# Patient Record
Sex: FEMALE | Race: WHITE | Employment: FULL TIME | URBAN - METROPOLITAN AREA
[De-identification: names, ages, dates, MRNs, and addresses within clinical notes are randomized per-mention and may not be internally consistent; named-entity substitution may affect disease eponyms.]

---

## 2021-04-07 LAB — SARS-COV-2: DETECTED

## 2021-04-11 PROCEDURE — 96365 THER/PROPH/DIAG IV INF INIT: CPT

## 2021-04-11 PROCEDURE — 99284 EMERGENCY DEPT VISIT MOD MDM: CPT

## 2021-04-12 ENCOUNTER — HOSPITAL ENCOUNTER (INPATIENT)
Age: 21
LOS: 3 days | Discharge: HOME | DRG: 566 | End: 2021-04-15
Attending: EMERGENCY MEDICINE | Admitting: OBSTETRICS & GYNECOLOGY
Payer: MEDICARE

## 2021-04-12 ENCOUNTER — APPOINTMENT (OUTPATIENT)
Dept: GENERAL RADIOLOGY | Age: 21
DRG: 566 | End: 2021-04-12
Payer: MEDICARE

## 2021-04-12 DIAGNOSIS — U07.1 COVID-19 VIRUS INFECTION: Primary | ICD-10-CM

## 2021-04-12 DIAGNOSIS — Z3A.32 32 WEEKS GESTATION OF PREGNANCY: ICD-10-CM

## 2021-04-12 PROBLEM — Z3A.30 30 WEEKS GESTATION OF PREGNANCY: Status: ACTIVE | Noted: 2021-04-12

## 2021-04-12 PROBLEM — O99.891 BACTERIURIA IN PREGNANCY: Status: ACTIVE | Noted: 2021-04-12

## 2021-04-12 PROBLEM — R82.71 BACTERIURIA IN PREGNANCY: Status: ACTIVE | Noted: 2021-04-12

## 2021-04-12 PROBLEM — J06.9 2019-NCOV ACUTE RESPIRATORY DISEASE: Status: ACTIVE | Noted: 2021-04-12

## 2021-04-12 LAB
A/G RATIO: 0.7 (ref 1.1–2.2)
A/G RATIO: 0.9 (ref 1.1–2.2)
ALBUMIN SERPL-MCNC: 2.6 G/DL (ref 3.4–5)
ALBUMIN SERPL-MCNC: 3 G/DL (ref 3.4–5)
ALP BLD-CCNC: 139 U/L (ref 40–129)
ALP BLD-CCNC: 148 U/L (ref 40–129)
ALT SERPL-CCNC: 22 U/L (ref 10–40)
ALT SERPL-CCNC: 25 U/L (ref 10–40)
ANION GAP SERPL CALCULATED.3IONS-SCNC: 13 MMOL/L (ref 3–16)
ANION GAP SERPL CALCULATED.3IONS-SCNC: 15 MMOL/L (ref 3–16)
APTT: 29.3 SEC (ref 24.2–36.2)
AST SERPL-CCNC: 22 U/L (ref 15–37)
AST SERPL-CCNC: 22 U/L (ref 15–37)
BACTERIA: ABNORMAL /HPF
BASOPHILS ABSOLUTE: 0 K/UL (ref 0–0.2)
BASOPHILS ABSOLUTE: 0.1 K/UL (ref 0–0.2)
BASOPHILS RELATIVE PERCENT: 0.1 %
BASOPHILS RELATIVE PERCENT: 0.6 %
BILIRUB SERPL-MCNC: 0.7 MG/DL (ref 0–1)
BILIRUB SERPL-MCNC: 0.8 MG/DL (ref 0–1)
BILIRUBIN URINE: ABNORMAL
BLOOD, URINE: NEGATIVE
BUN BLDV-MCNC: 5 MG/DL (ref 7–20)
BUN BLDV-MCNC: 7 MG/DL (ref 7–20)
CALCIUM SERPL-MCNC: 7.9 MG/DL (ref 8.3–10.6)
CALCIUM SERPL-MCNC: 8.1 MG/DL (ref 8.3–10.6)
CHLORIDE BLD-SCNC: 107 MMOL/L (ref 99–110)
CHLORIDE BLD-SCNC: 110 MMOL/L (ref 99–110)
CLARITY: CLEAR
CO2: 12 MMOL/L (ref 21–32)
CO2: 15 MMOL/L (ref 21–32)
COLOR: ABNORMAL
CREAT SERPL-MCNC: <0.5 MG/DL (ref 0.6–1.1)
CREAT SERPL-MCNC: <0.5 MG/DL (ref 0.6–1.1)
D DIMER: 445 NG/ML DDU (ref 0–229)
EKG ATRIAL RATE: 112 BPM
EKG DIAGNOSIS: NORMAL
EKG P AXIS: 92 DEGREES
EKG P-R INTERVAL: 82 MS
EKG Q-T INTERVAL: 354 MS
EKG QRS DURATION: 88 MS
EKG QTC CALCULATION (BAZETT): 483 MS
EKG R AXIS: 84 DEGREES
EKG T AXIS: 49 DEGREES
EKG VENTRICULAR RATE: 112 BPM
EOSINOPHILS ABSOLUTE: 0 K/UL (ref 0–0.6)
EOSINOPHILS ABSOLUTE: 0 K/UL (ref 0–0.6)
EOSINOPHILS RELATIVE PERCENT: 0 %
EOSINOPHILS RELATIVE PERCENT: 0.1 %
EPITHELIAL CELLS, UA: 5 /HPF (ref 0–5)
GFR AFRICAN AMERICAN: >60
GFR AFRICAN AMERICAN: >60
GFR NON-AFRICAN AMERICAN: >60
GFR NON-AFRICAN AMERICAN: >60
GLOBULIN: 3.2 G/DL
GLOBULIN: 3.5 G/DL
GLUCOSE BLD-MCNC: 87 MG/DL (ref 70–99)
GLUCOSE BLD-MCNC: 95 MG/DL (ref 70–99)
GLUCOSE URINE: NEGATIVE MG/DL
HCT VFR BLD CALC: 30.4 % (ref 36–48)
HCT VFR BLD CALC: 32.3 % (ref 36–48)
HEMOGLOBIN: 10.1 G/DL (ref 12–16)
HEMOGLOBIN: 10.3 G/DL (ref 12–16)
HYALINE CASTS: 4 /LPF (ref 0–8)
INR BLD: 0.95 (ref 0.86–1.14)
KETONES, URINE: >=80 MG/DL
LACTIC ACID, SEPSIS: 0.8 MMOL/L (ref 0.4–1.9)
LACTIC ACID, SEPSIS: 0.8 MMOL/L (ref 0.4–1.9)
LEUKOCYTE ESTERASE, URINE: ABNORMAL
LYMPHOCYTES ABSOLUTE: 0.8 K/UL (ref 1–5.1)
LYMPHOCYTES ABSOLUTE: 1.1 K/UL (ref 1–5.1)
LYMPHOCYTES RELATIVE PERCENT: 11 %
LYMPHOCYTES RELATIVE PERCENT: 6.8 %
MCH RBC QN AUTO: 27.2 PG (ref 26–34)
MCH RBC QN AUTO: 27.4 PG (ref 26–34)
MCHC RBC AUTO-ENTMCNC: 31.9 G/DL (ref 31–36)
MCHC RBC AUTO-ENTMCNC: 33.3 G/DL (ref 31–36)
MCV RBC AUTO: 81.8 FL (ref 80–100)
MCV RBC AUTO: 85.8 FL (ref 80–100)
MICROSCOPIC EXAMINATION: YES
MONOCYTES ABSOLUTE: 0.5 K/UL (ref 0–1.3)
MONOCYTES ABSOLUTE: 0.8 K/UL (ref 0–1.3)
MONOCYTES RELATIVE PERCENT: 4.5 %
MONOCYTES RELATIVE PERCENT: 6.7 %
NEUTROPHILS ABSOLUTE: 10 K/UL (ref 1.7–7.7)
NEUTROPHILS ABSOLUTE: 8.4 K/UL (ref 1.7–7.7)
NEUTROPHILS RELATIVE PERCENT: 83.9 %
NEUTROPHILS RELATIVE PERCENT: 86.3 %
NITRITE, URINE: NEGATIVE
PDW BLD-RTO: 14.6 % (ref 12.4–15.4)
PDW BLD-RTO: 15 % (ref 12.4–15.4)
PH UA: 6.5 (ref 5–8)
PLATELET # BLD: 172 K/UL (ref 135–450)
PLATELET # BLD: 235 K/UL (ref 135–450)
PMV BLD AUTO: 7.9 FL (ref 5–10.5)
PMV BLD AUTO: 8.6 FL (ref 5–10.5)
POTASSIUM REFLEX MAGNESIUM: 3.7 MMOL/L (ref 3.5–5.1)
POTASSIUM REFLEX MAGNESIUM: 3.9 MMOL/L (ref 3.5–5.1)
PROTEIN UA: ABNORMAL MG/DL
PROTHROMBIN TIME: 11 SEC (ref 10–13.2)
RBC # BLD: 3.72 M/UL (ref 4–5.2)
RBC # BLD: 3.76 M/UL (ref 4–5.2)
RBC UA: 2 /HPF (ref 0–4)
SODIUM BLD-SCNC: 135 MMOL/L (ref 136–145)
SODIUM BLD-SCNC: 137 MMOL/L (ref 136–145)
SPECIFIC GRAVITY UA: 1.03 (ref 1–1.03)
TOTAL PROTEIN: 6.1 G/DL (ref 6.4–8.2)
TOTAL PROTEIN: 6.2 G/DL (ref 6.4–8.2)
TROPONIN: <0.01 NG/ML
URINE TYPE: ABNORMAL
UROBILINOGEN, URINE: >=8 E.U./DL
WBC # BLD: 10 K/UL (ref 4–11)
WBC # BLD: 11.5 K/UL (ref 4–11)
WBC UA: 4 /HPF (ref 0–5)

## 2021-04-12 PROCEDURE — 85379 FIBRIN DEGRADATION QUANT: CPT

## 2021-04-12 PROCEDURE — 81001 URINALYSIS AUTO W/SCOPE: CPT

## 2021-04-12 PROCEDURE — 6360000002 HC RX W HCPCS: Performed by: INTERNAL MEDICINE

## 2021-04-12 PROCEDURE — 83605 ASSAY OF LACTIC ACID: CPT

## 2021-04-12 PROCEDURE — 94760 N-INVAS EAR/PLS OXIMETRY 1: CPT

## 2021-04-12 PROCEDURE — 84484 ASSAY OF TROPONIN QUANT: CPT

## 2021-04-12 PROCEDURE — 85610 PROTHROMBIN TIME: CPT

## 2021-04-12 PROCEDURE — 2580000003 HC RX 258: Performed by: STUDENT IN AN ORGANIZED HEALTH CARE EDUCATION/TRAINING PROGRAM

## 2021-04-12 PROCEDURE — 6370000000 HC RX 637 (ALT 250 FOR IP): Performed by: EMERGENCY MEDICINE

## 2021-04-12 PROCEDURE — 2580000003 HC RX 258: Performed by: EMERGENCY MEDICINE

## 2021-04-12 PROCEDURE — 6370000000 HC RX 637 (ALT 250 FOR IP): Performed by: INTERNAL MEDICINE

## 2021-04-12 PROCEDURE — 6360000002 HC RX W HCPCS: Performed by: EMERGENCY MEDICINE

## 2021-04-12 PROCEDURE — 85730 THROMBOPLASTIN TIME PARTIAL: CPT

## 2021-04-12 PROCEDURE — 93010 ELECTROCARDIOGRAM REPORT: CPT | Performed by: INTERNAL MEDICINE

## 2021-04-12 PROCEDURE — 59025 FETAL NON-STRESS TEST: CPT

## 2021-04-12 PROCEDURE — 85025 COMPLETE CBC W/AUTO DIFF WBC: CPT

## 2021-04-12 PROCEDURE — 6370000000 HC RX 637 (ALT 250 FOR IP): Performed by: OBSTETRICS & GYNECOLOGY

## 2021-04-12 PROCEDURE — 2060000000 HC ICU INTERMEDIATE R&B

## 2021-04-12 PROCEDURE — 93005 ELECTROCARDIOGRAM TRACING: CPT | Performed by: EMERGENCY MEDICINE

## 2021-04-12 PROCEDURE — 80053 COMPREHEN METABOLIC PANEL: CPT

## 2021-04-12 PROCEDURE — 36415 COLL VENOUS BLD VENIPUNCTURE: CPT

## 2021-04-12 PROCEDURE — 71045 X-RAY EXAM CHEST 1 VIEW: CPT

## 2021-04-12 RX ORDER — LABETALOL 100 MG/1
100 TABLET, FILM COATED ORAL 2 TIMES DAILY
COMMUNITY
Start: 2021-03-24 | End: 2022-03-24

## 2021-04-12 RX ORDER — PROMETHAZINE HYDROCHLORIDE 25 MG/1
25 TABLET ORAL EVERY 6 HOURS PRN
COMMUNITY
Start: 2021-04-07

## 2021-04-12 RX ORDER — DEXAMETHASONE SODIUM PHOSPHATE 10 MG/ML
8 INJECTION, SOLUTION INTRAMUSCULAR; INTRAVENOUS EVERY 24 HOURS
Status: DISCONTINUED | OUTPATIENT
Start: 2021-04-12 | End: 2021-04-15 | Stop reason: HOSPADM

## 2021-04-12 RX ORDER — 0.9 % SODIUM CHLORIDE 0.9 %
1000 INTRAVENOUS SOLUTION INTRAVENOUS ONCE
Status: COMPLETED | OUTPATIENT
Start: 2021-04-12 | End: 2021-04-12

## 2021-04-12 RX ORDER — GUAIFENESIN/DEXTROMETHORPHAN 100-10MG/5
5 SYRUP ORAL EVERY 4 HOURS PRN
Status: DISCONTINUED | OUTPATIENT
Start: 2021-04-12 | End: 2021-04-15 | Stop reason: HOSPADM

## 2021-04-12 RX ORDER — ACETAMINOPHEN 500 MG
500 TABLET ORAL EVERY 6 HOURS PRN
COMMUNITY
Start: 2021-04-07

## 2021-04-12 RX ORDER — ACETAMINOPHEN 325 MG/1
650 TABLET ORAL EVERY 6 HOURS PRN
Status: DISCONTINUED | OUTPATIENT
Start: 2021-04-12 | End: 2021-04-15 | Stop reason: HOSPADM

## 2021-04-12 RX ORDER — ONDANSETRON 2 MG/ML
4 INJECTION INTRAMUSCULAR; INTRAVENOUS EVERY 6 HOURS PRN
Status: DISCONTINUED | OUTPATIENT
Start: 2021-04-12 | End: 2021-04-15 | Stop reason: HOSPADM

## 2021-04-12 RX ORDER — ACETAMINOPHEN 325 MG/1
650 TABLET ORAL ONCE
Status: COMPLETED | OUTPATIENT
Start: 2021-04-12 | End: 2021-04-12

## 2021-04-12 RX ORDER — SODIUM CHLORIDE 0.9 % (FLUSH) 0.9 %
5-40 SYRINGE (ML) INJECTION EVERY 12 HOURS SCHEDULED
Status: DISCONTINUED | OUTPATIENT
Start: 2021-04-12 | End: 2021-04-15 | Stop reason: HOSPADM

## 2021-04-12 RX ORDER — ACETAMINOPHEN 650 MG/1
650 SUPPOSITORY RECTAL EVERY 6 HOURS PRN
Status: DISCONTINUED | OUTPATIENT
Start: 2021-04-12 | End: 2021-04-15 | Stop reason: HOSPADM

## 2021-04-12 RX ORDER — SODIUM CHLORIDE 0.9 % (FLUSH) 0.9 %
5-40 SYRINGE (ML) INJECTION PRN
Status: DISCONTINUED | OUTPATIENT
Start: 2021-04-12 | End: 2021-04-15 | Stop reason: HOSPADM

## 2021-04-12 RX ORDER — SODIUM CHLORIDE 9 MG/ML
25 INJECTION, SOLUTION INTRAVENOUS PRN
Status: DISCONTINUED | OUTPATIENT
Start: 2021-04-12 | End: 2021-04-15 | Stop reason: HOSPADM

## 2021-04-12 RX ORDER — PRENATAL VIT/IRON FUM/FOLIC AC 27MG-0.8MG
1 TABLET ORAL DAILY
Status: DISCONTINUED | OUTPATIENT
Start: 2021-04-12 | End: 2021-04-15 | Stop reason: HOSPADM

## 2021-04-12 RX ADMIN — SODIUM CHLORIDE, PRESERVATIVE FREE 10 ML: 5 INJECTION INTRAVENOUS at 20:57

## 2021-04-12 RX ADMIN — SODIUM CHLORIDE 1000 ML: 9 INJECTION, SOLUTION INTRAVENOUS at 01:22

## 2021-04-12 RX ADMIN — DEXAMETHASONE SODIUM PHOSPHATE 8 MG: 10 INJECTION, SOLUTION INTRAMUSCULAR; INTRAVENOUS at 16:31

## 2021-04-12 RX ADMIN — CEFTRIAXONE 1000 MG: 1 INJECTION, POWDER, FOR SOLUTION INTRAMUSCULAR; INTRAVENOUS at 05:00

## 2021-04-12 RX ADMIN — PRENATAL VIT W/ FE FUMARATE-FA TAB 27-0.8 MG 1 TABLET: 27-0.8 TAB at 12:16

## 2021-04-12 RX ADMIN — GUAIFENESIN SYRUP AND DEXTROMETHORPHAN 5 ML: 100; 10 SYRUP ORAL at 20:56

## 2021-04-12 RX ADMIN — SODIUM CHLORIDE 1000 ML: 9 INJECTION, SOLUTION INTRAVENOUS at 05:50

## 2021-04-12 RX ADMIN — ACETAMINOPHEN 650 MG: 325 TABLET ORAL at 01:22

## 2021-04-12 ASSESSMENT — PAIN SCALES - GENERAL
PAINLEVEL_OUTOF10: 0
PAINLEVEL_OUTOF10: 5

## 2021-04-12 ASSESSMENT — PAIN DESCRIPTION - ORIENTATION
ORIENTATION: MID
ORIENTATION: MID

## 2021-04-12 ASSESSMENT — PAIN DESCRIPTION - FREQUENCY: FREQUENCY: CONTINUOUS

## 2021-04-12 ASSESSMENT — PAIN DESCRIPTION - DESCRIPTORS: DESCRIPTORS: ACHING

## 2021-04-12 ASSESSMENT — PAIN - FUNCTIONAL ASSESSMENT
PAIN_FUNCTIONAL_ASSESSMENT: ACTIVITIES ARE NOT PREVENTED
PAIN_FUNCTIONAL_ASSESSMENT: ACTIVITIES ARE NOT PREVENTED

## 2021-04-12 ASSESSMENT — PAIN DESCRIPTION - ONSET: ONSET: ON-GOING

## 2021-04-12 ASSESSMENT — PAIN DESCRIPTION - PROGRESSION: CLINICAL_PROGRESSION: NOT CHANGED

## 2021-04-12 NOTE — CONSULTS
BMI 32.50 kg/m²      General appearance: No apparent distress, appears stated age and cooperative. HEENT: Normal cephalic, atraumatic without obvious deformity. Pupils equal, round, and reactive to light. Extra ocular muscles intact. Conjunctivae/corneas clear. Neck: Supple, with full range of motion. No jugular venous distention. Trachea midline. Respiratory:  Normal respiratory effort. Clear to auscultation, bilaterally without Rales/Wheezes/Rhonchi. Cardiovascular: Regular rate and rhythm with normal S1/S2 without murmurs, rubs or gallops. Abdomen: Soft, non-tender, distended with normal bowel sounds. Musculoskeletal: No clubbing, cyanosis or edema bilaterally. Skin: Skin color, texture, turgor normal.  No rashes or lesions. Neurologic:  Neurovascularly intact without any focal sensory/motor deficits.  Cranial nerves: II-XII intact, grossly non-focal.  Psychiatric: Alert and oriented, thought content appropriate, normal insight  Capillary Refill: Brisk,< 3 seconds   Peripheral Pulses: +2 palpable, equal bilaterally     Labs:     Recent Labs     04/12/21 0115 04/12/21  0658   WBC 11.5* 10.0   HGB 10.1* 10.3*   HCT 30.4* 32.3*    172     Recent Labs     04/12/21 0115 04/12/21  0658   * 137   K 3.7 3.9    110   CO2 15* 12*   BUN 7 5*   CREATININE <0.5* <0.5*   CALCIUM 8.1* 7.9*     Recent Labs     04/12/21 0115 04/12/21  0658   AST 22 22   ALT 25 22   BILITOT 0.8 0.7   ALKPHOS 139* 148*     Recent Labs     04/12/21 0115   INR 0.95     Recent Labs     04/12/21 0115   TROPONINI <0.01       Urinalysis:    Lab Results   Component Value Date    NITRU Negative 04/12/2021    WBCUA 4 04/12/2021    BACTERIA RARE 04/12/2021    RBCUA 2 04/12/2021    BLOODU Negative 04/12/2021    SPECGRAV 1.027 04/12/2021    GLUCOSEU Negative 04/12/2021       Radiology: I have reviewed the radiology reports with the following interpretation:     XR CHEST PORTABLE   Final Result   Multifocal airspace opacities worrisome for atypical/viral pneumonia               ASSESSMENT:    Active Hospital Problems    Diagnosis Date Noted    COVID-19 [U07.1] 04/12/2021    30 weeks gestation of pregnancy [Z3A.30] 04/12/2021    2019-nCoV acute respiratory disease [U07.1, J06.9] 04/12/2021    Bacteriuria in pregnancy [O99.891, R82.71] 04/12/2021       PLAN:    COVID 19 PNA - diagnosed outside on Wednesday, will start IV decadron given hypoxia. Monitor.  Add robitussin for cough prn, CXR reviewed - started on rocephin    32 week pregnancy - ob/gyn consulted    Acute hypoxic resp failure - placed on 2 L O2      DVT Prophylaxis: SCD  Diet: DIET GENERAL;  Dietary Nutrition Supplements: Standard High Calorie Oral Supplement  Code Status: Full Code    PT/OT Eval Status: Active and ongoing    Dispo - cont care    Thank you for the consultation, will follow up as needed    Electronically signed by Ly Swartz MD on 4/12/21 at 6:58 PM EDT

## 2021-04-12 NOTE — PROGRESS NOTES
Pt to Rm#[id number] via ED stretcher with all personal belongings. Oriented to room and role as RN. Pt transfered to bed, alert and oriented x4. Assessment of pt in progress. POC and education initiated per protocol. Call light within reach. Bed in lowest position and wheels locked. Room is free of clutter. Personal belongings within reach. Will continue to monitor. Pt notably labored breathing/tachypneic with RR of 26 and with frequent dry/non-productive cough that is causing 5/10 mid chest pain that is sore/aching in nature. Pt updated on POC.

## 2021-04-12 NOTE — ED PROVIDER NOTES
[facility]      Pt Name: Yenni Rolon  MRN: [id number]  [place] 2000  Date of evaluation: 4/11/2021  Provider: Maggie Vanegas MD    CHIEF COMPLAINT       Chief Complaint   Patient presents with    Positive For Covid-19     + for COVID pneumonia on Wednesday, c/o worsening shortness of breath and coughing, also states urine is brown; did not get any antibx, 32 weeks pregnant         HISTORY OF PRESENT ILLNESS   (Location/Symptom, Timing/Onset,Context/Setting, Quality, Duration, Modifying Factors, Severity)  Note limiting factors. Yenni Rolon is a 23 y.o. female who presents to the emergency department for evaluation of worsening COVID-19 symptoms. Patient reports that she is currently approximately 32 weeks pregnant, G1, P0. She began feeling ill last Sunday and was diagnosed with Covid on Wednesday. She states she has been feeling worse every day since Wednesday. She does report shortness of breath and midsternal chest pain which she describes as sharp, worse with movement, coughing, and deep inspiration. She denies any unilateral lower extremity swelling. She states that she has been trying to drink, but eating less than normal and she noticed that her urine is a brownish color. She does report cough which is been nonproductive as well as shortness of breath. She follows with Dr. Bard Romberg for OB/GYN, and has had normal prenatal care. Patient does report that she has had some intermittent suprapubic discomfort as well, but is still feeling baby move normally. She states that the suprapubic discomfort is worsened by coughing as well. No vaginal discharge or bleeding. NursingNotes were reviewed. REVIEW OF SYSTEMS    (2-9 systems for level 4, 10 or more for level 5)       Constitutional: No fever. Chills, fatigue. Eye: No visual disturbances. No eye pain. Ear/Nose/Mouth/Throat: No nasal congestion.  No sore throat. Respiratory: Nonproductive cough, shortness of breath, No sputum production. Cardiovascular: Midsternal pleuritic chest pain. No palpitations. Gastrointestinal: No abdominal pain. No nausea or vomiting  Genitourinary: No dysuria. No hematuria. Hematology/Lymphatics: No bleeding or bruising tendency. Immunologic: Generalized malaise. No swollen glands. Musculoskeletal: No back pain. Diffuse body aches. Integumentary: No rash. No abrasions. Neurologic: No headache. No focal numbness or weakness. PAST MEDICAL HISTORY   History reviewed. No pertinent past medical history. SURGICALHISTORY     History reviewed. No pertinent surgical history. CURRENT MEDICATIONS       Previous Medications    No medications on file       ALLERGIES     Patient has no known allergies. FAMILY HISTORY     History reviewed. No pertinent family history.        SOCIAL HISTORY       Social History     Socioeconomic History    Marital status: Single     Spouse name: None    Number of children: None    Years of education: None    Highest education level: None   Occupational History    None   Social Needs    Financial resource strain: None    Food insecurity     Worry: None     Inability: None    Transportation needs     Medical: None     Non-medical: None   Tobacco Use    Smoking status: Never Smoker    Smokeless tobacco: Never Used   Substance and Sexual Activity    Alcohol use: None    Drug use: None    Sexual activity: None   Lifestyle    Physical activity     Days per week: None     Minutes per session: None    Stress: None   Relationships    Social connections     Talks on phone: None     Gets together: None     Attends [employer] service: None     Active member of club or organization: None     Attends meetings of clubs or organizations: None     Relationship status: None    Intimate partner violence     Fear of current or ex partner: None     Emotionally abused: None     Physically abused: None Forced sexual activity: None   Other Topics Concern    None   Social History Narrative    None       SCREENINGS             PHYSICAL EXAM    (up to 7 for level 4, 8 or more for level 5)     ED Triage Vitals [04/11/21 2347]   BP Temp Temp Source Pulse Resp SpO2 Height Weight   116/73 98.9 °F (37.2 °C) Oral 119 24 94 % 5' 5\" (1.651 m) 195 lb 5.2 oz (88.6 kg)       General: Alert and oriented, No acute distress. Eye: Normal conjunctiva. Pupils equal and reactive. HENT: Oral mucosa is moist.  Respiratory: Lungs are clear to auscultation, Respirations are non-labored. Cardiovascular: Normal rate, Regular rhythm. Gastrointestinal: Soft, Non-tender, Non-distended. Gravid abdomen. Musculoskeletal: No swelling. Integumentary: Warm, Dry. Neurologic: Alert, Oriented, No focal defects. Psychiatric: Cooperative.     DIAGNOSTIC RESULTS     EKG: All EKG's are interpreted by the Emergency Department Physician who either signs or Co-signsthis chart in the absence of a cardiologist.    Per my interpretation:    Electrocardiogram (ECG) 4/12/2021 103  RATE: 112 bpm  RHYTHM: normal sinus  AXIS: normal  INTERVALS: normal  ST-T WAVE CHANGES: No evidence of ST segment elevation or T-wave inversion, nonspecific ST abnormality  Prior for comparison - none    RADIOLOGY:   Non-plain filmimages such as CT, Ultrasound and MRI are read by the radiologist. Plain radiographic images are visualized and preliminarily interpreted by the emergency physician with the below findings:      Interpretation per the Radiologist below, if available at the time ofthis note:    XR CHEST PORTABLE   Final Result   Multifocal airspace opacities worrisome for atypical/viral pneumonia               ED BEDSIDE ULTRASOUND:   Performed by ED Physician - none    LABS:  Labs Reviewed   CBC WITH AUTO DIFFERENTIAL - Abnormal; Notable for the following components:       Result Value    WBC 11.5 (*)     RBC 3.72 (*)     Hemoglobin 10.1 (*)     Hematocrit 30.4 (*)     Neutrophils Absolute 10.0 (*)     Lymphocytes Absolute 0.8 (*)     All other components within normal limits    Narrative:     Performed at:  [address] [facility] [employer]   Phone [phone]   COMPREHENSIVE METABOLIC PANEL W/ REFLEX TO MG FOR LOW K - Abnormal; Notable for the following components:    Sodium 135 (*)     CO2 15 (*)     CREATININE <0.5 (*)     Calcium 8.1 (*)     Total Protein 6.2 (*)     Albumin 3.0 (*)     Albumin/Globulin Ratio 0.9 (*)     Alkaline Phosphatase 139 (*)     All other components within normal limits    Narrative:     Performed at:  [address] [facility] [address]   Phone [phone]   D-DIMER, QUANTITATIVE - Abnormal; Notable for the following components:    D-Dimer, Quant 445 (*)     All other components within normal limits    Narrative:     Performed at:  [address] [facility] [address]   Phone [phone]   URINALYSIS - Abnormal; Notable for the following components:    Bilirubin Urine MODERATE (*)     Ketones, Urine >=80 (*)     Protein, UA TRACE (*)     Urobilinogen, Urine >=8.0 (*)     Leukocyte Esterase, Urine TRACE (*)     All other components within normal limits    Narrative:     Performed at:  [address] [facility]   Phone [phone]   MICROSCOPIC URINALYSIS - Abnormal; Notable for the following components:    Bacteria, UA RARE (*)     All other components within normal limits    Narrative:     Performed at:  [address] [facility]   Phone [phone]   TROPONIN    Narrative:     Performed at:  [facility]  [address] [place] [address] [facility]   Phone [phone]   LACTATE, SEPSIS    Narrative:     Performed at:  [facility]  [address] [place] [facility] [facility] [address]   Phone [phone]   PROTIME-INR    Narrative:     Performed at:  [facility]  [address] [place] [facility] [address]   Phone [phone]   APTT    Narrative:     Performed at:  [facility]  [address]  [place] [facility] [address]   Phone [phone]   LACTATE, SEPSIS       All other labs were within normal range or not returned as of this dictation. EMERGENCY DEPARTMENT COURSE and DIFFERENTIAL DIAGNOSIS/MDM:   Vitals:    Vitals:    04/11/21 2347 04/12/21 0215   BP: 116/73    Pulse: 119 107   Resp: 24 27   Temp: 98.9 °F (37.2 °C)    TempSrc: Oral    SpO2: 94% 94%   Weight: 195 lb 5.2 oz (88.6 kg)    Height: 5' 5\" (1.651 m)          Medical decision making: This is a 59-year-old female G1, P0 at 28 weeks gestation who presents for evaluation of worsening shortness of breath in the setting of COVID-19 infection, diagnosed outpatient on Wednesday. On exam, she is afebrile, tachycardic initially to the 110s, saturation of mid 90s on room air. Ambulation, the patient does not desaturate, but became tachypneic to the 40s, and could not even complete a lap around 1 part of the emergency department. Differential diagnosis includes worsening Covid infection, secondary bacterial infection, pulmonary edema, pulmonary embolus. EKG was obtained shows no evidence of acute ischemia, troponin is negative. CBC with mild episodes of 11.5. CMP is reassuring. Lactate is normal.  Chest x-ray does show multifocal airspace opacities consistent with viral pneumonia. Given Covid and pregnancy, as well as tachycardia and pleuritic chest pain, did consider pulmonary embolus.   D-dimer was obtained and was 445, as this is less than 500 ng/mL, according to the [place] ACS clinical management tool for PE evaluation of pregnancy, PE risk is less than 1% and risk of further work-up likely outweigh benefit. Urinalysis shows trace leukocytes and rare bacteria however given pregnancy we will treat for asymptomatic bacteriuria with ceftriaxone. Urine culture was sent. Urinalysis did note significant ketones, and the second liter of IV fluids as ordered. Given increased work of breathing and tachypnea, did recommend admission for observation and IV hydration and patient is agreeable. I spoke with Dr. Doron Pittman of OB/GYN as well as Dr. Eliz Schmitt of the hospitalist service, and it was agreed that the patient will be admitted under OB/GYN, but hospitalist will manage COVID-19 treatment orders. Patient is agreeable with the plan of care, and stable at the time of admission. CRITICAL CARE TIME   Total Critical Care time was 0 minutes, excluding separately reportable procedures. There was a high probability of clinically significant/life threatening deterioration in the patient's condition which required my urgent intervention. CONSULTS:  IP CONSULT TO OB GYN    PROCEDURES:  Unless otherwise noted below, none         FINAL IMPRESSION      1. COVID-19 virus infection    2. 32 weeks gestation of pregnancy          DISPOSITION/PLAN   DISPOSITION Decision To Admit 04/12/2021 04:21:51 AM      PATIENT REFERRED TO:  No follow-up provider specified.     DISCHARGE MEDICATIONS:  New Prescriptions    No medications on file          (Please note that portions of this note were completed with a voice recognition program.Efforts were made to edit the dictations but occasionally words are mis-transcribed.)    Mat Tran MD (electronically signed)  Attending Emergency Physician            Mat Tran MD  04/12/21 5133

## 2021-04-12 NOTE — LETTER
[facility]  [address] [address]  Phone: [phone]    No name on file. April 15, 2021     Patient: Mateus Jane   YOB: 2000   Date of Visit: 4/11/2021       To Whom It May Concern:    Mateus Jane was admitted to the hospital from 04/12/21 to 04/15/21. It is my medical opinion that Mateus Jane may return to work on 04/18/21 with no restrictions. If you have any questions or concerns, please don't hesitate to call.     Sincerely,        Jesu Wills RN

## 2021-04-12 NOTE — CARE COORDINATION
INITIAL CASE MANAGEMENT ASSESSMENT    Reviewed chart, met with patient to assess possible discharge needs. Explained Case Management role/services. Living Situation: Confirmed address, lives w/ boyfriend, 1 level house, level entry    ADLs: Independent, boyfriend assists with homemaking     DME: None    PT/OT Recs: Not ordered     Active Services: None     Transportation: Active  - boyfriend to transport home     Medications: [address]    PCP: None - has just been seeing OB/GYN (Dr Patti Devine) at [facility] - PCP list given for follow up      HD/PD: n/a    PLAN/COMMENTS: Patient plans on returning home w/ boyfriend at discharge. Patient 32 weeks pregnant, on 2L O2 -- monitor for needs. Patient tested positive for covid on 4/7 at [facility] provided contact information for patient or family to call with any questions. CM will follow and assist as needed.     Electronically signed by Sheng Stark RN Case Management [phone] on 4/12/2021 at 11:06 AM

## 2021-04-12 NOTE — H&P
Department of Obstetrics and Gynecology   Obstetrics History and Physical        CHIEF COMPLAINT:   Patient came for shortness of breath with positive COVID test    HISTORY OF PRESENT ILLNESS:    Charlotte Layton  is a 23 y.o.  female at 28 weeks presents with a chief complaint as above and is being admitted for  Respiratory distress. Patient first noted symptoms about one week ago. She initially complained of fever, fatigue, body aches which has progressed to shortness of breath and chest pain. She feels her heart beating  rapidly and she is having trouble breathing with activity. She denies OB complaints. ..she denies contractions, bleeding, loss of fluid or decreased fetal movements         PRENATAL CARE: Complicated by: gestational hypertension    PAST OB HISTORY:  OB History        1    Para        Term                AB        Living           SAB        TAB        Ectopic        Molar        Multiple        Live Births                  Past Medical History:    No medical problems outside of pregnancy    Past Surgical History:    Patient denies previous surgery    Allergies:  NKDA    Social History:  Denies alcohol, tobacco, or street drug use    Family History:   History reviewed. No pertinent family history. Medications Prior to Admission:  Labetalol  Phenergan    REVIEW OF SYSTEMS:   fevers, fatigue, malaise, body aches,  Dyspnea, palpitations     PHYSICAL EXAM:    Vitals:    21 2347 21 0215 21 0521 21 0522   BP: 116/73  113/66    Pulse: 119 107 87 86   Resp: 24 27 (!) 32 27   Temp: 98.9 °F (37.2 °C)      TempSrc: Oral      SpO2: 94% 94% 97% 97%   Weight: 195 lb 5.2 oz (88.6 kg)      Height: 5' 5\" (1.651 m)        General appearance:  awake, alert, cooperative, no apparent distress, and appears stated age  Neurologic:  Awake, alert, oriented to name, place and time. Lungs: rapid breathing, decreased breath sounds  Cardiac:  Tachycardia.  No murmurs  Abdomen: Soft, non tender, gravid, fundal height consistent with the gestational age, EFW by Leopold's maneuvers is  , 4 lbs. ,  8 oz., vertex  Pelvis:  Adequate pelvis  Cervix:  Not checked as not in labor  Contraction frequency: every 0 minutes  Membranes:  Intact  Labs:   CBC with Differential:    Lab Results   Component Value Date    WBC 11.5 04/12/2021    RBC 3.72 04/12/2021    HGB 10.1 04/12/2021    HCT 30.4 04/12/2021     04/12/2021    MCV 81.8 04/12/2021    MCH 27.2 04/12/2021    MCHC 33.3 04/12/2021    RDW 14.6 04/12/2021    LYMPHOPCT 6.8 04/12/2021    MONOPCT 6.7 04/12/2021    BASOPCT 0.1 04/12/2021    MONOSABS 0.8 04/12/2021    LYMPHSABS 0.8 04/12/2021    EOSABS 0.0 04/12/2021    BASOSABS 0.0 04/12/2021     CMP:    Lab Results   Component Value Date     04/12/2021    K 3.7 04/12/2021     04/12/2021    CO2 15 04/12/2021    BUN 7 04/12/2021    CREATININE <0.5 04/12/2021    GFRAA >60 04/12/2021    AGRATIO 0.9 04/12/2021    LABGLOM >60 04/12/2021    GLUCOSE 95 04/12/2021    PROT 6.2 04/12/2021    LABALBU 3.0 04/12/2021    CALCIUM 8.1 04/12/2021    BILITOT 0.8 04/12/2021    ALKPHOS 139 04/12/2021    AST 22 04/12/2021    ALT 25 04/12/2021     PT/INR:    Lab Results   Component Value Date    PROTIME 11.0 04/12/2021    INR 0.95 04/12/2021     PTT:    Lab Results   Component Value Date    APTT 29.3 04/12/2021   [APTT}  U/A:    Lab Results   Component Value Date    COLORU Dark Yellow 04/12/2021    PROTEINU TRACE 04/12/2021    PHUR 6.5 04/12/2021    WBCUA 4 04/12/2021    RBCUA 2 04/12/2021    BACTERIA RARE 04/12/2021    CLARITYU Clear 04/12/2021    SPECGRAV 1.027 04/12/2021    LEUKOCYTESUR TRACE 04/12/2021    UROBILINOGEN >=8.0 04/12/2021    BILIRUBINUR MODERATE 04/12/2021    BLOODU Negative 04/12/2021    GLUCOSEU Negative 04/12/2021       ASSESSMENT: 32 weeks, (private OB at [facility]), admitted for acute respiratory distress in pregnancy     PLAN:  Admit to PCU, managed by hospitalist.

## 2021-04-12 NOTE — PLAN OF CARE
Problem: Pain:  Goal: Pain level will decrease  Description: Pain level will decrease  Outcome: Ongoing  Pain/discomfort being managed with PRN analgesics per MD orders. Pt able to express presence and absence of pain and rate pain appropriately using numerical scale.

## 2021-04-12 NOTE — ED NOTES
Pt ambulated with pulse oximetry. Pt had oxygen saturation in normal range but became tachypneic with ambulation. Pt states that she \"can't catch her breath\" and ambulation stopped short. Milan Winter MD made aware.      Bertha Pedersen RN  04/12/21 0658

## 2021-04-13 LAB
A/G RATIO: 0.9 (ref 1.1–2.2)
ALBUMIN SERPL-MCNC: 2.9 G/DL (ref 3.4–5)
ALP BLD-CCNC: 134 U/L (ref 40–129)
ALT SERPL-CCNC: 18 U/L (ref 10–40)
ANION GAP SERPL CALCULATED.3IONS-SCNC: 11 MMOL/L (ref 3–16)
AST SERPL-CCNC: 17 U/L (ref 15–37)
BASOPHILS ABSOLUTE: 0 K/UL (ref 0–0.2)
BASOPHILS RELATIVE PERCENT: 0.4 %
BILIRUB SERPL-MCNC: 0.7 MG/DL (ref 0–1)
BUN BLDV-MCNC: 4 MG/DL (ref 7–20)
CALCIUM SERPL-MCNC: 8.1 MG/DL (ref 8.3–10.6)
CHLORIDE BLD-SCNC: 110 MMOL/L (ref 99–110)
CO2: 18 MMOL/L (ref 21–32)
CREAT SERPL-MCNC: <0.5 MG/DL (ref 0.6–1.1)
EOSINOPHILS ABSOLUTE: 0 K/UL (ref 0–0.6)
EOSINOPHILS RELATIVE PERCENT: 0 %
GFR AFRICAN AMERICAN: >60
GFR NON-AFRICAN AMERICAN: >60
GLOBULIN: 3.1 G/DL
GLUCOSE BLD-MCNC: 95 MG/DL (ref 70–99)
HCT VFR BLD CALC: 29.6 % (ref 36–48)
HEMOGLOBIN: 10 G/DL (ref 12–16)
LYMPHOCYTES ABSOLUTE: 0.9 K/UL (ref 1–5.1)
LYMPHOCYTES RELATIVE PERCENT: 7.8 %
MCH RBC QN AUTO: 27.6 PG (ref 26–34)
MCHC RBC AUTO-ENTMCNC: 33.8 G/DL (ref 31–36)
MCV RBC AUTO: 81.7 FL (ref 80–100)
MONOCYTES ABSOLUTE: 0.5 K/UL (ref 0–1.3)
MONOCYTES RELATIVE PERCENT: 5 %
NEUTROPHILS ABSOLUTE: 9.4 K/UL (ref 1.7–7.7)
NEUTROPHILS RELATIVE PERCENT: 86.8 %
PDW BLD-RTO: 14.7 % (ref 12.4–15.4)
PLATELET # BLD: 229 K/UL (ref 135–450)
PMV BLD AUTO: 7.9 FL (ref 5–10.5)
POTASSIUM REFLEX MAGNESIUM: 3.8 MMOL/L (ref 3.5–5.1)
PROCALCITONIN: 0.18 NG/ML (ref 0–0.15)
RBC # BLD: 3.62 M/UL (ref 4–5.2)
SODIUM BLD-SCNC: 139 MMOL/L (ref 136–145)
TOTAL PROTEIN: 6 G/DL (ref 6.4–8.2)
WBC # BLD: 10.9 K/UL (ref 4–11)

## 2021-04-13 PROCEDURE — 6360000002 HC RX W HCPCS: Performed by: INTERNAL MEDICINE

## 2021-04-13 PROCEDURE — 6360000002 HC RX W HCPCS: Performed by: STUDENT IN AN ORGANIZED HEALTH CARE EDUCATION/TRAINING PROGRAM

## 2021-04-13 PROCEDURE — 59025 FETAL NON-STRESS TEST: CPT

## 2021-04-13 PROCEDURE — 36415 COLL VENOUS BLD VENIPUNCTURE: CPT

## 2021-04-13 PROCEDURE — 2700000000 HC OXYGEN THERAPY PER DAY

## 2021-04-13 PROCEDURE — 84145 PROCALCITONIN (PCT): CPT

## 2021-04-13 PROCEDURE — 6370000000 HC RX 637 (ALT 250 FOR IP): Performed by: INTERNAL MEDICINE

## 2021-04-13 PROCEDURE — 94761 N-INVAS EAR/PLS OXIMETRY MLT: CPT

## 2021-04-13 PROCEDURE — 2060000000 HC ICU INTERMEDIATE R&B

## 2021-04-13 PROCEDURE — 85025 COMPLETE CBC W/AUTO DIFF WBC: CPT

## 2021-04-13 PROCEDURE — 2580000003 HC RX 258: Performed by: STUDENT IN AN ORGANIZED HEALTH CARE EDUCATION/TRAINING PROGRAM

## 2021-04-13 PROCEDURE — 6370000000 HC RX 637 (ALT 250 FOR IP): Performed by: OBSTETRICS & GYNECOLOGY

## 2021-04-13 PROCEDURE — 80053 COMPREHEN METABOLIC PANEL: CPT

## 2021-04-13 RX ADMIN — SODIUM CHLORIDE, PRESERVATIVE FREE 10 ML: 5 INJECTION INTRAVENOUS at 19:54

## 2021-04-13 RX ADMIN — PRENATAL VIT W/ FE FUMARATE-FA TAB 27-0.8 MG 1 TABLET: 27-0.8 TAB at 07:55

## 2021-04-13 RX ADMIN — DEXAMETHASONE SODIUM PHOSPHATE 8 MG: 10 INJECTION, SOLUTION INTRAMUSCULAR; INTRAVENOUS at 15:35

## 2021-04-13 RX ADMIN — SODIUM CHLORIDE, PRESERVATIVE FREE 10 ML: 5 INJECTION INTRAVENOUS at 07:55

## 2021-04-13 RX ADMIN — CEFTRIAXONE 1000 MG: 1 INJECTION, POWDER, FOR SOLUTION INTRAMUSCULAR; INTRAVENOUS at 04:49

## 2021-04-13 RX ADMIN — GUAIFENESIN SYRUP AND DEXTROMETHORPHAN 5 ML: 100; 10 SYRUP ORAL at 04:49

## 2021-04-13 RX ADMIN — GUAIFENESIN SYRUP AND DEXTROMETHORPHAN 5 ML: 100; 10 SYRUP ORAL at 15:40

## 2021-04-13 ASSESSMENT — PAIN SCALES - GENERAL
PAINLEVEL_OUTOF10: 0
PAINLEVEL_OUTOF10: 0

## 2021-04-13 NOTE — PLAN OF CARE
Problem: Pain:  Goal: Pain level will decrease  Description: Pain level will decrease  4/13/2021 1420 by Aurora Sánchez RN  Outcome: Ongoing   Pain/discomfort being managed with PRN analgesics per MD orders. Pt able to express presence and absence of pain and rate pain appropriately using numerical scale. Problem: Gas Exchange - Impaired  Goal: Absence of hypoxia  4/13/2021 1420 by Aurora Sánchez RN  Outcome: Ongoing     Problem: Airway Clearance - Ineffective  Goal: Achieve or maintain patent airway  4/13/2021 1420 by Aurora Sánchez RN  Outcome: Ongoing     Problem: Nutrition Deficits  Goal: Optimize nutritional status  4/13/2021 1420 by Aurora Sánchez RN  Outcome: Ongoing   Nutrition status assessed and documented. Patient encouraged to take time while eating. Patient educated on the importance of sodium and fluid intake in meals. Will continue to monitor.

## 2021-04-13 NOTE — PROGRESS NOTES
Hospitalist Progress Note      PCP: No primary care provider on file. Date of Admission: 4/12/2021    Chief Complaint: Shortness of breath    Hospital Course: 79-year-old female who is 32 weeks pregnant admitted to the hospital COVID-19 pneumonia    Subjective: Patient feels slightly more short of breath this morning. Denies any fever or chills. She has been coughing and stated that her chest hurts with deep breathing      Medications:  Reviewed    Infusion Medications    sodium chloride       Scheduled Medications    sodium chloride flush  5-40 mL Intravenous 2 times per day    cefTRIAXone (ROCEPHIN) IV  1,000 mg Intravenous Q24H    prenatal vitamin  1 tablet Oral Daily    dexamethasone  8 mg Intravenous Q24H     PRN Meds: sodium chloride flush, sodium chloride, acetaminophen **OR** acetaminophen, ondansetron, guaiFENesin-dextromethorphan      Intake/Output Summary (Last 24 hours) at 4/13/2021 1800  Last data filed at 4/13/2021 1757  Gross per 24 hour   Intake 960 ml   Output 700 ml   Net 260 ml       Physical Exam Performed:    /72   Pulse 101   Temp 97.6 °F (36.4 °C) (Oral)   Resp 24   Ht 5' 5\" (1.651 m)   Wt 195 lb 12.3 oz (88.8 kg)   SpO2 98%   BMI 32.58 kg/m²     General appearance: No apparent distress, appears stated age and cooperative. HEENT: Pupils equal, round, and reactive to light. Conjunctivae/corneas clear. Neck: Supple, with full range of motion. No jugular venous distention. Trachea midline. Respiratory:  Normal respiratory effort. Clear to auscultation, bilaterally without Rales/Wheezes/Rhonchi. Cardiovascular: Regular rate and rhythm with normal S1/S2 without murmurs, rubs or gallops. Abdomen: Soft, non-tender, non-distended with normal bowel sounds. Musculoskeletal: No clubbing, cyanosis or edema bilaterally. Full range of motion without deformity. Skin: Skin color, texture, turgor normal.  No rashes or lesions.   Neurologic:  Neurovascularly intact without any focal sensory/motor deficits.  Cranial nerves: II-XII intact, grossly non-focal.  Psychiatric: Alert and oriented, thought content appropriate, normal insight  Capillary Refill: Brisk,< 3 seconds   Peripheral Pulses: +2 palpable, equal bilaterally       Labs:   Recent Labs     21  0115 21  0658 21  0606   WBC 11.5* 10.0 10.9   HGB 10.1* 10.3* 10.0*   HCT 30.4* 32.3* 29.6*    172 229     Recent Labs     21  0115 21  0658 21  0606   * 137 139   K 3.7 3.9 3.8    110 110   CO2 15* 12* 18*   BUN 7 5* 4*   CREATININE <0.5* <0.5* <0.5*   CALCIUM 8.1* 7.9* 8.1*     Recent Labs     21  0115 21  0658 21  0606   AST 22 22 17   ALT 25 22 18   BILITOT 0.8 0.7 0.7   ALKPHOS 139* 148* 134*     Recent Labs     21  0115   INR 0.95     Recent Labs     21  011   TROPONINI <0.01       Urinalysis:      Lab Results   Component Value Date    NITRU Negative 2021    WBCUA 4 2021    BACTERIA RARE 2021    RBCUA 2 2021    BLOODU Negative 2021    SPECGRAV 1.027 2021    GLUCOSEU Negative 2021       Radiology:  XR CHEST PORTABLE   Final Result   Multifocal airspace opacities worrisome for atypical/viral pneumonia                 Assessment/Plan:    Active Hospital Problems    Diagnosis    COVID-19 [U07.1]    30 weeks gestation of pregnancy [Z3A.30]    2019-nCoV acute respiratory disease [U07.1, J06.9]    Bacteriuria in pregnancy [O99.891, R82.71]       Acute hypoxic respiratory failure due to COVID-19 pneumonia  -Continue Decadron  -Continue oxygen, wean as tolerated  -Check procalcitonin, if negative then will stop Rocephin    Pregnancy -  32 weeks  -OB/GYN following    Diet: DIET GENERAL;  Dietary Nutrition Supplements: Standard High Calorie Oral Supplement  Code Status: Full Code    Dispo -continue monitor oxygen requirements    Rupinder Gloria MD

## 2021-04-13 NOTE — PROGRESS NOTES
Pt's SpO2 down to 82-83%. This RN went to the bedside to find pt resting quietly in bed. Pt denies any recent activity such as getting up out of bed. O2 increased from 2 to 3lpm via nasal cannula.

## 2021-04-13 NOTE — FLOWSHEET NOTE
RN to room for daily NST. Patient 32.5 weeks- baby girl \"June\". EFM applied with consent to central monitor bank with alarms on. Uterus soft and non-tender. + fetal movement per pt. Denies contractions, leaking fluid, and vaginal bleeding/pain. Audible movement on US.

## 2021-04-13 NOTE — PROGRESS NOTES
Patient is 28 5/7 w pregnant. Covid pos, atypical pneumonia, managed by hospitalist. On Decadron, Rocephin and supportive trx. Has no OB related complaints as contractions, decreased fetal movement, LOF, VB. Fetus is active. Fetal nonstress test was reactive today. Will continue daily testing and remote rounding. Awa Peraza do not hesitate to call with any questions or concerns: [phone]. I called pt's Ob/Gyn office (Dr Jere Abdi, [phone]) and informed them of patient's admission.

## 2021-04-13 NOTE — PLAN OF CARE
Problem: Pain:  Goal: Pain level will decrease  Description: Pain level will decrease  4/13/2021 0030 by Fan Pascal RN  Outcome: Ongoing     Problem: Pain:  Goal: Control of acute pain  Description: Control of acute pain  4/13/2021 0030 by Fan Pascal RN  Outcome: Ongoing     Problem: Pain:  Goal: Control of chronic pain  Description: Control of chronic pain  4/13/2021 0030 by Fan Pascal RN  Outcome: Ongoing     Problem: Airway Clearance - Ineffective  Goal: Achieve or maintain patent airway  Outcome: Ongoing     Problem: Gas Exchange - Impaired  Goal: Absence of hypoxia  Outcome: Ongoing     Problem: Gas Exchange - Impaired  Goal: Promote optimal lung function  Outcome: Ongoing     Problem: Breathing Pattern - Ineffective  Goal: Ability to achieve and maintain a regular respiratory rate  Outcome: Ongoing     Problem: Body Temperature -  Risk of, Imbalanced  Goal: Ability to maintain a body temperature within defined limits  Outcome: Ongoing     Problem: Body Temperature -  Risk of, Imbalanced  Goal: Will regain or maintain usual level of consciousness  Outcome: Ongoing     Problem:  Body Temperature -  Risk of, Imbalanced  Goal: Complications related to the disease process, condition or treatment will be avoided or minimized  Outcome: Ongoing     Problem: Isolation Precautions - Risk of Spread of Infection  Goal: Prevent transmission of infection  Outcome: Ongoing     Problem: Nutrition Deficits  Goal: Optimize nutritional status  Outcome: Ongoing     Problem: Risk for Fluid Volume Deficit  Goal: Maintain normal heart rhythm  Outcome: Ongoing     Problem: Risk for Fluid Volume Deficit  Goal: Maintain absence of muscle cramping  Outcome: Ongoing     Problem: Risk for Fluid Volume Deficit  Goal: Maintain normal serum potassium, sodium, calcium, phosphorus, and pH  Outcome: Ongoing     Problem: Loneliness or Risk for Loneliness  Goal: Demonstrate positive use of time alone when socialization is not

## 2021-04-14 LAB
A/G RATIO: 0.9 (ref 1.1–2.2)
ALBUMIN SERPL-MCNC: 2.9 G/DL (ref 3.4–5)
ALP BLD-CCNC: 143 U/L (ref 40–129)
ALT SERPL-CCNC: 19 U/L (ref 10–40)
ANION GAP SERPL CALCULATED.3IONS-SCNC: 11 MMOL/L (ref 3–16)
AST SERPL-CCNC: 21 U/L (ref 15–37)
BASOPHILS ABSOLUTE: 0 K/UL (ref 0–0.2)
BASOPHILS RELATIVE PERCENT: 0.1 %
BILIRUB SERPL-MCNC: 0.7 MG/DL (ref 0–1)
BUN BLDV-MCNC: 6 MG/DL (ref 7–20)
CALCIUM SERPL-MCNC: 8.4 MG/DL (ref 8.3–10.6)
CHLORIDE BLD-SCNC: 108 MMOL/L (ref 99–110)
CO2: 18 MMOL/L (ref 21–32)
CREAT SERPL-MCNC: <0.5 MG/DL (ref 0.6–1.1)
EOSINOPHILS ABSOLUTE: 0 K/UL (ref 0–0.6)
EOSINOPHILS RELATIVE PERCENT: 0 %
GFR AFRICAN AMERICAN: >60
GFR NON-AFRICAN AMERICAN: >60
GLOBULIN: 3.3 G/DL
GLUCOSE BLD-MCNC: 84 MG/DL (ref 70–99)
HCT VFR BLD CALC: 29.5 % (ref 36–48)
HEMOGLOBIN: 10 G/DL (ref 12–16)
LYMPHOCYTES ABSOLUTE: 1 K/UL (ref 1–5.1)
LYMPHOCYTES RELATIVE PERCENT: 9.3 %
MCH RBC QN AUTO: 27.6 PG (ref 26–34)
MCHC RBC AUTO-ENTMCNC: 33.9 G/DL (ref 31–36)
MCV RBC AUTO: 81.4 FL (ref 80–100)
MONOCYTES ABSOLUTE: 0.5 K/UL (ref 0–1.3)
MONOCYTES RELATIVE PERCENT: 4.9 %
NEUTROPHILS ABSOLUTE: 9.2 K/UL (ref 1.7–7.7)
NEUTROPHILS RELATIVE PERCENT: 85.7 %
PDW BLD-RTO: 14.9 % (ref 12.4–15.4)
PLATELET # BLD: 249 K/UL (ref 135–450)
PMV BLD AUTO: 7.8 FL (ref 5–10.5)
POTASSIUM REFLEX MAGNESIUM: 3.6 MMOL/L (ref 3.5–5.1)
RBC # BLD: 3.63 M/UL (ref 4–5.2)
SODIUM BLD-SCNC: 137 MMOL/L (ref 136–145)
TOTAL PROTEIN: 6.2 G/DL (ref 6.4–8.2)
WBC # BLD: 10.8 K/UL (ref 4–11)

## 2021-04-14 PROCEDURE — 85025 COMPLETE CBC W/AUTO DIFF WBC: CPT

## 2021-04-14 PROCEDURE — 6370000000 HC RX 637 (ALT 250 FOR IP): Performed by: INTERNAL MEDICINE

## 2021-04-14 PROCEDURE — 80053 COMPREHEN METABOLIC PANEL: CPT

## 2021-04-14 PROCEDURE — 2580000003 HC RX 258: Performed by: STUDENT IN AN ORGANIZED HEALTH CARE EDUCATION/TRAINING PROGRAM

## 2021-04-14 PROCEDURE — 6360000002 HC RX W HCPCS: Performed by: INTERNAL MEDICINE

## 2021-04-14 PROCEDURE — 6360000002 HC RX W HCPCS: Performed by: STUDENT IN AN ORGANIZED HEALTH CARE EDUCATION/TRAINING PROGRAM

## 2021-04-14 PROCEDURE — 2060000000 HC ICU INTERMEDIATE R&B

## 2021-04-14 PROCEDURE — 6370000000 HC RX 637 (ALT 250 FOR IP): Performed by: OBSTETRICS & GYNECOLOGY

## 2021-04-14 PROCEDURE — 36415 COLL VENOUS BLD VENIPUNCTURE: CPT

## 2021-04-14 PROCEDURE — 59025 FETAL NON-STRESS TEST: CPT

## 2021-04-14 RX ADMIN — SODIUM CHLORIDE, PRESERVATIVE FREE 10 ML: 5 INJECTION INTRAVENOUS at 09:08

## 2021-04-14 RX ADMIN — DEXAMETHASONE SODIUM PHOSPHATE 8 MG: 10 INJECTION, SOLUTION INTRAMUSCULAR; INTRAVENOUS at 14:04

## 2021-04-14 RX ADMIN — CEFTRIAXONE 1000 MG: 1 INJECTION, POWDER, FOR SOLUTION INTRAMUSCULAR; INTRAVENOUS at 05:51

## 2021-04-14 RX ADMIN — SODIUM CHLORIDE, PRESERVATIVE FREE 10 ML: 5 INJECTION INTRAVENOUS at 20:45

## 2021-04-14 RX ADMIN — GUAIFENESIN SYRUP AND DEXTROMETHORPHAN 5 ML: 100; 10 SYRUP ORAL at 14:04

## 2021-04-14 RX ADMIN — PRENATAL VIT W/ FE FUMARATE-FA TAB 27-0.8 MG 1 TABLET: 27-0.8 TAB at 14:03

## 2021-04-14 RX ADMIN — GUAIFENESIN SYRUP AND DEXTROMETHORPHAN 5 ML: 100; 10 SYRUP ORAL at 09:07

## 2021-04-14 ASSESSMENT — PAIN SCALES - GENERAL
PAINLEVEL_OUTOF10: 0
PAINLEVEL_OUTOF10: 0

## 2021-04-14 NOTE — PLAN OF CARE
Pain/discomfort being managed with PRN analgesics per MD orders. Pt able to express presence and absence of pain and rate pain appropriately using numerical scale. Patient assessed for fall risk; fall precautions initiated. Patient and family instructed about safety devices. Environment kept free of clutter and adequate lighting provided. Bed locked and in lowest position. Call light within reach. Will continue to monitor.     Intake/Output Summary (Last 24 hours) at 4/14/2021 0734  Last data filed at 4/14/2021 0534  Gross per 24 hour   Intake 600 ml   Output 1200 ml   Net -600 ml     Vitals:    04/14/21 0534   BP: 97/73   Pulse: 91   Resp: 18   Temp: 98.3 °F (36.8 °C)   SpO2: 97%

## 2021-04-14 NOTE — PROGRESS NOTES
Hospitalist Progress Note      PCP: No primary care provider on file. Date of Admission: 4/12/2021    Chief Complaint: Shortness of breath    Hospital Course: 48-year-old female who is 32 weeks pregnant admitted to the hospital COVID-19 pneumonia    Subjective: No new events overnight. Symptoms slightly improved today. Oxygen levels down to 1 L. Medications:  Reviewed    Infusion Medications    sodium chloride       Scheduled Medications    sodium chloride flush  5-40 mL Intravenous 2 times per day    cefTRIAXone (ROCEPHIN) IV  1,000 mg Intravenous Q24H    prenatal vitamin  1 tablet Oral Daily    dexamethasone  8 mg Intravenous Q24H     PRN Meds: sodium chloride flush, sodium chloride, acetaminophen **OR** acetaminophen, ondansetron, guaiFENesin-dextromethorphan      Intake/Output Summary (Last 24 hours) at 4/14/2021 1311  Last data filed at 4/14/2021 0534  Gross per 24 hour   Intake 360 ml   Output 1200 ml   Net -840 ml       Physical Exam Performed:    /71   Pulse 112   Temp 98.3 °F (36.8 °C) (Oral)   Resp 24   Ht 5' 5\" (1.651 m)   Wt 194 lb 7.1 oz (88.2 kg)   SpO2 97%   BMI 32.36 kg/m²     General appearance: No apparent distress, appears stated age and cooperative. HEENT: Pupils equal, round, and reactive to light. Conjunctivae/corneas clear. Neck: Supple, with full range of motion. No jugular venous distention. Trachea midline. Respiratory:  Normal respiratory effort. Clear to auscultation, bilaterally without Rales/Wheezes/Rhonchi. Cardiovascular: Regular rate and rhythm with normal S1/S2 without murmurs, rubs or gallops. Abdomen: Soft, non-tender, non-distended with normal bowel sounds. Musculoskeletal: No clubbing, cyanosis or edema bilaterally. Full range of motion without deformity. Skin: Skin color, texture, turgor normal.  No rashes or lesions. Neurologic:  Neurovascularly intact without any focal sensory/motor deficits.  Cranial nerves: II-XII intact, grossly

## 2021-04-14 NOTE — FLOWSHEET NOTE
04/14/21 0940   Fetal Heart Rate   Mode External US   Baseline Rate 150 bpm   Baseline Classification Normal   Variability 6-25 BPM   Pattern Accelerations   Patient Feels Fetal Movement Yes   Interventions RN at Bedside  (for daily NST)   OB Bladder Status Voiding;Non-distended

## 2021-04-14 NOTE — FLOWSHEET NOTE
Labor RN at bedside for daily NST. EFM and TOCO applied to central bank monitoring. Abdomen palpates soft. Patient denies feeling contractions, LOF, or bleeding. Patient also states baby has been moving. Reactive NST noted 's with accels- no contractions.

## 2021-04-14 NOTE — PROGRESS NOTES
Department of Obstetrics and Gynecology  Fetal surveillance testing summary    INDICATIONS: Covid pneumonia     OBJECTIVE RESULTS:  Time of the test: 5114-8815    Fetal surveillance: 160, reactive      Electronically signed by Tamir Higgins MD on 4/14/2021 at 7:55 AM

## 2021-04-15 VITALS
DIASTOLIC BLOOD PRESSURE: 79 MMHG | BODY MASS INDEX: 32.07 KG/M2 | RESPIRATION RATE: 20 BRPM | HEIGHT: 65 IN | OXYGEN SATURATION: 96 % | HEART RATE: 100 BPM | TEMPERATURE: 98.2 F | WEIGHT: 192.46 LBS | SYSTOLIC BLOOD PRESSURE: 122 MMHG

## 2021-04-15 LAB
A/G RATIO: 0.8 (ref 1.1–2.2)
ALBUMIN SERPL-MCNC: 2.8 G/DL (ref 3.4–5)
ALP BLD-CCNC: 155 U/L (ref 40–129)
ALT SERPL-CCNC: 42 U/L (ref 10–40)
ANION GAP SERPL CALCULATED.3IONS-SCNC: 15 MMOL/L (ref 3–16)
ANISOCYTOSIS: ABNORMAL
AST SERPL-CCNC: 45 U/L (ref 15–37)
BANDED NEUTROPHILS RELATIVE PERCENT: 2 % (ref 0–7)
BASOPHILS ABSOLUTE: 0 K/UL (ref 0–0.2)
BASOPHILS RELATIVE PERCENT: 0 %
BILIRUB SERPL-MCNC: 0.4 MG/DL (ref 0–1)
BUN BLDV-MCNC: 6 MG/DL (ref 7–20)
CALCIUM SERPL-MCNC: 8.6 MG/DL (ref 8.3–10.6)
CHLORIDE BLD-SCNC: 109 MMOL/L (ref 99–110)
CO2: 17 MMOL/L (ref 21–32)
CREAT SERPL-MCNC: <0.5 MG/DL (ref 0.6–1.1)
EOSINOPHILS ABSOLUTE: 0 K/UL (ref 0–0.6)
EOSINOPHILS RELATIVE PERCENT: 0 %
GFR AFRICAN AMERICAN: >60
GFR NON-AFRICAN AMERICAN: >60
GLOBULIN: 3.3 G/DL
GLUCOSE BLD-MCNC: 105 MG/DL (ref 70–99)
HCT VFR BLD CALC: 29.9 % (ref 36–48)
HEMOGLOBIN: 9.6 G/DL (ref 12–16)
LYMPHOCYTES ABSOLUTE: 0.4 K/UL (ref 1–5.1)
LYMPHOCYTES RELATIVE PERCENT: 6 %
MAGNESIUM: 1.8 MG/DL (ref 1.8–2.4)
MCH RBC QN AUTO: 26.4 PG (ref 26–34)
MCHC RBC AUTO-ENTMCNC: 32 G/DL (ref 31–36)
MCV RBC AUTO: 82.8 FL (ref 80–100)
METAMYELOCYTES RELATIVE PERCENT: 4 %
MONOCYTES ABSOLUTE: 0.4 K/UL (ref 0–1.3)
MONOCYTES RELATIVE PERCENT: 5 %
MYELOCYTE PERCENT: 3 %
NEUTROPHILS ABSOLUTE: 6.6 K/UL (ref 1.7–7.7)
NEUTROPHILS RELATIVE PERCENT: 80 %
PDW BLD-RTO: 14.6 % (ref 12.4–15.4)
PLATELET # BLD: 260 K/UL (ref 135–450)
PMV BLD AUTO: 7.9 FL (ref 5–10.5)
POLYCHROMASIA: ABNORMAL
POTASSIUM REFLEX MAGNESIUM: 3.2 MMOL/L (ref 3.5–5.1)
RBC # BLD: 3.61 M/UL (ref 4–5.2)
SODIUM BLD-SCNC: 141 MMOL/L (ref 136–145)
TOTAL PROTEIN: 6.1 G/DL (ref 6.4–8.2)
WBC # BLD: 7.4 K/UL (ref 4–11)

## 2021-04-15 PROCEDURE — 2700000000 HC OXYGEN THERAPY PER DAY

## 2021-04-15 PROCEDURE — 80053 COMPREHEN METABOLIC PANEL: CPT

## 2021-04-15 PROCEDURE — 85025 COMPLETE CBC W/AUTO DIFF WBC: CPT

## 2021-04-15 PROCEDURE — 2580000003 HC RX 258: Performed by: STUDENT IN AN ORGANIZED HEALTH CARE EDUCATION/TRAINING PROGRAM

## 2021-04-15 PROCEDURE — 94761 N-INVAS EAR/PLS OXIMETRY MLT: CPT

## 2021-04-15 PROCEDURE — 59025 FETAL NON-STRESS TEST: CPT

## 2021-04-15 PROCEDURE — 83735 ASSAY OF MAGNESIUM: CPT

## 2021-04-15 PROCEDURE — 6370000000 HC RX 637 (ALT 250 FOR IP): Performed by: OBSTETRICS & GYNECOLOGY

## 2021-04-15 PROCEDURE — 6370000000 HC RX 637 (ALT 250 FOR IP): Performed by: INTERNAL MEDICINE

## 2021-04-15 PROCEDURE — 6370000000 HC RX 637 (ALT 250 FOR IP): Performed by: STUDENT IN AN ORGANIZED HEALTH CARE EDUCATION/TRAINING PROGRAM

## 2021-04-15 PROCEDURE — 36415 COLL VENOUS BLD VENIPUNCTURE: CPT

## 2021-04-15 PROCEDURE — 6360000002 HC RX W HCPCS: Performed by: STUDENT IN AN ORGANIZED HEALTH CARE EDUCATION/TRAINING PROGRAM

## 2021-04-15 RX ORDER — GUAIFENESIN/DEXTROMETHORPHAN 100-10MG/5
5 SYRUP ORAL EVERY 4 HOURS PRN
Qty: 120 ML | Refills: 1 | Status: SHIPPED | OUTPATIENT
Start: 2021-04-15 | End: 2021-04-25

## 2021-04-15 RX ORDER — DEXAMETHASONE 6 MG/1
6 TABLET ORAL
Qty: 6 TABLET | Refills: 0 | Status: SHIPPED | OUTPATIENT
Start: 2021-04-15 | End: 2021-04-21

## 2021-04-15 RX ORDER — POTASSIUM CHLORIDE 20 MEQ/1
40 TABLET, EXTENDED RELEASE ORAL ONCE
Status: COMPLETED | OUTPATIENT
Start: 2021-04-15 | End: 2021-04-15

## 2021-04-15 RX ADMIN — PRENATAL VIT W/ FE FUMARATE-FA TAB 27-0.8 MG 1 TABLET: 27-0.8 TAB at 08:20

## 2021-04-15 RX ADMIN — POTASSIUM CHLORIDE 40 MEQ: 1500 TABLET, EXTENDED RELEASE ORAL at 10:55

## 2021-04-15 RX ADMIN — CEFTRIAXONE 1000 MG: 1 INJECTION, POWDER, FOR SOLUTION INTRAMUSCULAR; INTRAVENOUS at 05:10

## 2021-04-15 RX ADMIN — ACETAMINOPHEN 650 MG: 325 TABLET ORAL at 08:20

## 2021-04-15 RX ADMIN — SODIUM CHLORIDE, PRESERVATIVE FREE 5 ML: 5 INJECTION INTRAVENOUS at 09:30

## 2021-04-15 RX ADMIN — GUAIFENESIN SYRUP AND DEXTROMETHORPHAN 5 ML: 100; 10 SYRUP ORAL at 05:17

## 2021-04-15 ASSESSMENT — PAIN SCALES - GENERAL: PAINLEVEL_OUTOF10: 0

## 2021-04-15 NOTE — FLOWSHEET NOTE
04/15/21 1018   Findings   Baseline 135 BPM   Baseline Classification Normal   Variability 6-25 BPM   Decelerations None   Accelerations Yes   Acoustic Stimulator No   Fetal Movement Yes   Multiple Gestation? No   Uterine contractions/10 min. 1  (x1 over 30 minutes)     4/15/21 NST Findings.

## 2021-04-15 NOTE — PROGRESS NOTES
grossly non-focal.  Psychiatric: Alert and oriented, thought content appropriate, normal insight  Capillary Refill: Brisk,< 3 seconds   Peripheral Pulses: +2 palpable, equal bilaterally       Labs:   Recent Labs     21  0606 21  0521 04/15/21  0554   WBC 10.9 10.8 7.4   HGB 10.0* 10.0* 9.6*   HCT 29.6* 29.5* 29.9*    249 260     Recent Labs     21  0606 21  0521 04/15/21  0554    137 141   K 3.8 3.6 3.2*    108 109   CO2 18* 18* 17*   BUN 4* 6* 6*   CREATININE <0.5* <0.5* <0.5*   CALCIUM 8.1* 8.4 8.6     Recent Labs     21  0606 21  0521 04/15/21  0554   AST 17 21 45*   ALT 18 19 42*   BILITOT 0.7 0.7 0.4   ALKPHOS 134* 143* 155*     No results for input(s): INR in the last 72 hours. No results for input(s): Burnice Hendricks in the last 72 hours. Urinalysis:      Lab Results   Component Value Date    NITRU Negative 2021    WBCUA 4 2021    BACTERIA RARE 2021    RBCUA 2 2021    BLOODU Negative 2021    SPECGRAV 1.027 2021    GLUCOSEU Negative 2021       Radiology:  XR CHEST PORTABLE   Final Result   Multifocal airspace opacities worrisome for atypical/viral pneumonia                 Assessment/Plan:    Active Hospital Problems    Diagnosis    COVID-19 [U07.1]    30 weeks gestation of pregnancy [Z3A.30]    2019-nCoV acute respiratory disease [U07.1, J06.9]    Bacteriuria in pregnancy [O99.891, R82.71]       Acute hypoxic respiratory failure due to COVID-19 pneumonia  -Continue Decadron  -Home oxygen eval today    Pregnancy -  32 weeks  -OB/GYN following    Diet: DIET GENERAL;  Dietary Nutrition Supplements: Standard High Calorie Oral Supplement  Code Status: Full Code    Dispo -patient overall looks clinically much improved.   Will perform home oxygen eval today prior to discharge    Thais Holt MD

## 2021-04-15 NOTE — PLAN OF CARE
Problem: Pain:  Goal: Pain level will decrease  Description: Pain level will decrease  Outcome: Ongoing  Goal: Control of acute pain  Description: Control of acute pain  Outcome: Ongoing  Goal: Control of chronic pain  Description: Control of chronic pain  Outcome: Ongoing     Problem: Airway Clearance - Ineffective  Goal: Achieve or maintain patent airway  Outcome: Ongoing     Problem: Gas Exchange - Impaired  Goal: Absence of hypoxia  Outcome: Ongoing  Goal: Promote optimal lung function  Outcome: Ongoing     Problem: Breathing Pattern - Ineffective  Goal: Ability to achieve and maintain a regular respiratory rate  Outcome: Ongoing     Problem:  Body Temperature -  Risk of, Imbalanced  Goal: Ability to maintain a body temperature within defined limits  Outcome: Ongoing  Goal: Will regain or maintain usual level of consciousness  Outcome: Ongoing  Goal: Complications related to the disease process, condition or treatment will be avoided or minimized  Outcome: Ongoing     Problem: Isolation Precautions - Risk of Spread of Infection  Goal: Prevent transmission of infection  Outcome: Ongoing     Problem: Nutrition Deficits  Goal: Optimize nutritional status  Outcome: Ongoing     Problem: Risk for Fluid Volume Deficit  Goal: Maintain normal heart rhythm  Outcome: Ongoing  Goal: Maintain absence of muscle cramping  Outcome: Ongoing  Goal: Maintain normal serum potassium, sodium, calcium, phosphorus, and pH  Outcome: Ongoing     Problem: Loneliness or Risk for Loneliness  Goal: Demonstrate positive use of time alone when socialization is not possible  Outcome: Ongoing     Problem: Fatigue  Goal: Verbalize increase energy and improved vitality  Outcome: Ongoing     Problem: Patient Education: Go to Patient Education Activity  Goal: Patient/Family Education  Outcome: Ongoing

## 2021-04-15 NOTE — CARE COORDINATION
DISCHARGE SUMMARY     DATE OF DISCHARGE: 4/15/2021    DISCHARGE DESTINATION: Home w/ boyfriend    TRANSPORTATION: Boyfriend    COMMENTS: Spoke to patient over the phone. Patient agreeable to dc home today. Denies needs. Boyfriend available to assist if needed. Patient did not qualify for home oxygen.   Electronically signed by Mj Maloney RN Case Management [phone] on 4/15/2021 at 2:03 PM

## 2021-04-15 NOTE — PROGRESS NOTES
CLINICAL PHARMACY NOTE: MEDS TO [address] Select Patient?: No  Total # of Prescriptions Filled: 2   The following medications were delivered to the patient:  Current Discharge Medication List      START taking these medications    Details   guaiFENesin-dextromethorphan (ROBITUSSIN DM) 100-10 MG/5ML syrup Take 5 mLs by mouth every 4 hours as needed for Cough  Qty: 120 mL, Refills: 1      dexamethasone (DECADRON) 6 MG tablet Take 1 tablet by mouth daily (with breakfast) for 6 days  Qty: 6 tablet, Refills: 0         ·   Total # of Interventions Completed: 0  Time Spent (min): 30    Additional Documentation:

## 2021-04-15 NOTE — DISCHARGE SUMMARY
Department of Obstetrics and Gynecology  Antepartum Discharge Summary    Admit Date: 4/12/2021    Admit Diagnosis: 2019-nCoV acute respiratory disease [U07.1, J06.9]; 32 wks IUP    Discharge Date: 4/15/2021  3:50 PM     Discharge Diagnoses: same     [place], [address] Medication Instructions [id number]    Printed on:04/15/21 1640   Medication Information                      acetaminophen (TYLENOL) 500 MG tablet  Take 500 mg by mouth every 6 hours as needed             dexamethasone (DECADRON) 6 MG tablet  Take 1 tablet by mouth daily (with breakfast) for 6 days             guaiFENesin-dextromethorphan (ROBITUSSIN DM) 100-10 MG/5ML syrup  Take 5 mLs by mouth every 4 hours as needed for Cough             labetalol (NORMODYNE) 100 MG tablet  Take 100 mg by mouth 2 times daily             promethazine (PHENERGAN) 25 MG tablet  Take 25 mg by mouth every 6 hours as needed                 Service: Obstetrics    Consults: Hospitalist, Respiratory Therapy, Social Work    Significant Diagnostic Studies: CXR    Treatments: Supplemental O2, Decadron    Condition at Discharge: stable    Hospital Course: uncomplicated    Discharge Instructions:     Activity: as tolerated    Diet: regular diet    Discharge to: Home    Disposition / Followup:  return to office in 1 week    Electronically signed by Sameer Gleason MD on 4/15/2021 at 4:40 PM

## 2021-04-15 NOTE — PROGRESS NOTES
[facility]    Respiratory Therapy   Home Oxygen Evaluation        Name: Teetee Marie Record Number: [id number]  Age: 23 y.o. Gender:  female   : 2000  Today's date: 4/15/2021  Room: K6C-3037/5259-01      Assessment        /79   Pulse 100   Temp 98.2 °F (36.8 °C) (Oral)   Resp 20   Ht 5' 5\" (1.651 m)   Wt 192 lb 7.4 oz (87.3 kg)   SpO2 96%   BMI 32.03 kg/m²     Patient Active Problem List   Diagnosis    COVID-19    30 weeks gestation of pregnancy    2019-nCoV acute respiratory disease    Bacteriuria in pregnancy       Social History:  Social History     Tobacco Use    Smoking status: Never Smoker    Smokeless tobacco: Never Used   Substance Use Topics    Alcohol use: Not Currently    Drug use: Never       Patient Room Air saturation at rest 96  %  Patient Room Air saturation upon ambulation 92 %     Patient removed from oxygen and placed on room air for 45 minutes prior to attempting evaluation. Patient walked in room for estimated 5 minutes. Patient did develop shortness of breath and fatigue with walking but saturation stayed 92% or better during that time. Oxygen saturations of 88% or less on RA qualifies patient for Home Oxygen        In your clinical assessment does the Patient Require Portable Oxygen Tanks?     No:                   Carlyn Car RCP on 4/15/2021 at 1:11 PM

## 2021-04-16 ENCOUNTER — CARE COORDINATION (OUTPATIENT)
Dept: CASE MANAGEMENT | Age: 21
End: 2021-04-16

## 2021-04-16 NOTE — CARE COORDINATION
Date/Time:  4/16/2021 1:53 PM  Attempted to reach patient by telephone. Call within 2 business days of discharge: Yes Left HIPPA compliant message requesting a return call. Will attempt to reach patient again.

## 2021-04-19 ENCOUNTER — CARE COORDINATION (OUTPATIENT)
Dept: CASE MANAGEMENT | Age: 21
End: 2021-04-19

## 2021-04-19 NOTE — CARE COORDINATION
2nd and final attempt at [place] [phone] monitoring discharge phone call. Unable to reach patient. Left message. Informed Summer this would be the final CTN outreach.